# Patient Record
Sex: MALE | Race: WHITE | Employment: FULL TIME | URBAN - METROPOLITAN AREA
[De-identification: names, ages, dates, MRNs, and addresses within clinical notes are randomized per-mention and may not be internally consistent; named-entity substitution may affect disease eponyms.]

---

## 2021-07-15 ENCOUNTER — OFFICE VISIT (OUTPATIENT)
Dept: FAMILY MEDICINE | Facility: CLINIC | Age: 41
End: 2021-07-15
Payer: COMMERCIAL

## 2021-07-15 ENCOUNTER — ANCILLARY PROCEDURE (OUTPATIENT)
Dept: GENERAL RADIOLOGY | Facility: CLINIC | Age: 41
End: 2021-07-15
Attending: PHYSICIAN ASSISTANT
Payer: COMMERCIAL

## 2021-07-15 VITALS
HEART RATE: 64 BPM | SYSTOLIC BLOOD PRESSURE: 121 MMHG | WEIGHT: 172 LBS | DIASTOLIC BLOOD PRESSURE: 85 MMHG | OXYGEN SATURATION: 100 %

## 2021-07-15 DIAGNOSIS — M25.562 PAIN AND SWELLING OF LEFT KNEE: Primary | ICD-10-CM

## 2021-07-15 DIAGNOSIS — M25.462 PAIN AND SWELLING OF LEFT KNEE: Primary | ICD-10-CM

## 2021-07-15 DIAGNOSIS — Z13.220 LIPID SCREENING: ICD-10-CM

## 2021-07-15 DIAGNOSIS — M25.562 PAIN AND SWELLING OF LEFT KNEE: ICD-10-CM

## 2021-07-15 DIAGNOSIS — Z13.1 SCREENING FOR DIABETES MELLITUS: ICD-10-CM

## 2021-07-15 DIAGNOSIS — M25.462 PAIN AND SWELLING OF LEFT KNEE: ICD-10-CM

## 2021-07-15 LAB
ALBUMIN SERPL-MCNC: 4 G/DL (ref 3.4–5)
ALP SERPL-CCNC: 45 U/L (ref 40–150)
ALT SERPL W P-5'-P-CCNC: 23 U/L (ref 0–70)
ANION GAP SERPL CALCULATED.3IONS-SCNC: 2 MMOL/L (ref 3–14)
AST SERPL W P-5'-P-CCNC: 11 U/L (ref 0–45)
BASOPHILS # BLD AUTO: 0 10E3/UL (ref 0–0.2)
BASOPHILS NFR BLD AUTO: 0 %
BILIRUB SERPL-MCNC: 0.4 MG/DL (ref 0.2–1.3)
BUN SERPL-MCNC: 11 MG/DL (ref 7–30)
CALCIUM SERPL-MCNC: 9.3 MG/DL (ref 8.5–10.1)
CHLORIDE BLD-SCNC: 104 MMOL/L (ref 94–109)
CHOLEST SERPL-MCNC: 189 MG/DL
CO2 SERPL-SCNC: 31 MMOL/L (ref 20–32)
CREAT SERPL-MCNC: 0.98 MG/DL (ref 0.66–1.25)
EOSINOPHIL # BLD AUTO: 0.1 10E3/UL (ref 0–0.7)
EOSINOPHIL NFR BLD AUTO: 1 %
ERYTHROCYTE [DISTWIDTH] IN BLOOD BY AUTOMATED COUNT: 13.4 % (ref 10–15)
FASTING STATUS PATIENT QL REPORTED: YES
GFR SERPL CREATININE-BSD FRML MDRD: >90 ML/MIN/1.73M2
GLUCOSE BLD-MCNC: 108 MG/DL (ref 70–99)
HCT VFR BLD AUTO: 47.4 % (ref 40–53)
HDLC SERPL-MCNC: 74 MG/DL
HGB BLD-MCNC: 16.1 G/DL (ref 13.3–17.7)
LDLC SERPL CALC-MCNC: 107 MG/DL
LYMPHOCYTES # BLD AUTO: 1.7 10E3/UL (ref 0.8–5.3)
LYMPHOCYTES NFR BLD AUTO: 21 %
MCH RBC QN AUTO: 31.6 PG (ref 26.5–33)
MCHC RBC AUTO-ENTMCNC: 34 G/DL (ref 31.5–36.5)
MCV RBC AUTO: 93 FL (ref 78–100)
MONOCYTES # BLD AUTO: 0.7 10E3/UL (ref 0–1.3)
MONOCYTES NFR BLD AUTO: 9 %
NEUTROPHILS # BLD AUTO: 5.4 10E3/UL (ref 1.6–8.3)
NEUTROPHILS NFR BLD AUTO: 69 %
NONHDLC SERPL-MCNC: 115 MG/DL
PLATELET # BLD AUTO: 282 10E3/UL (ref 150–450)
POTASSIUM BLD-SCNC: 4.2 MMOL/L (ref 3.4–5.3)
PROT SERPL-MCNC: 7.4 G/DL (ref 6.8–8.8)
RBC # BLD AUTO: 5.1 10E6/UL (ref 4.4–5.9)
SODIUM SERPL-SCNC: 137 MMOL/L (ref 133–144)
TRIGL SERPL-MCNC: 42 MG/DL
URATE SERPL-MCNC: 4.6 MG/DL
WBC # BLD AUTO: 7.9 10E3/UL (ref 4–11)

## 2021-07-15 PROCEDURE — 99204 OFFICE O/P NEW MOD 45 MIN: CPT | Performed by: PHYSICIAN ASSISTANT

## 2021-07-15 PROCEDURE — 80053 COMPREHEN METABOLIC PANEL: CPT | Performed by: PHYSICIAN ASSISTANT

## 2021-07-15 PROCEDURE — 85025 COMPLETE CBC W/AUTO DIFF WBC: CPT | Performed by: PHYSICIAN ASSISTANT

## 2021-07-15 PROCEDURE — 73562 X-RAY EXAM OF KNEE 3: CPT | Mod: LT | Performed by: RADIOLOGY

## 2021-07-15 PROCEDURE — 80061 LIPID PANEL: CPT | Performed by: PHYSICIAN ASSISTANT

## 2021-07-15 PROCEDURE — 84550 ASSAY OF BLOOD/URIC ACID: CPT | Performed by: PHYSICIAN ASSISTANT

## 2021-07-15 PROCEDURE — 36415 COLL VENOUS BLD VENIPUNCTURE: CPT | Performed by: PHYSICIAN ASSISTANT

## 2021-07-15 RX ORDER — INDOMETHACIN 50 MG/1
50 CAPSULE ORAL 2 TIMES DAILY WITH MEALS
Qty: 60 CAPSULE | Refills: 1 | Status: SHIPPED | OUTPATIENT
Start: 2021-07-15

## 2021-07-15 NOTE — RESULT ENCOUNTER NOTE
Jessica Conner,       Your recent test results are attached, if you have any questions or concerns please feel free to contact me via e-mail or call [phone].  *Anion gap is normal. Our lab readings have been off for a long time for that test so please ignore this.       Blood sugar is slightly elevated at 108. Eating healthy (less sugar and carbohydrates and more vegetables/lean meat) should help this. Would recheck 1 year. Liver and kidney tests normal. Cholesterol looks great.  Uric acid level is low making gout less likely.        It was a pleasure to see you at your recent office visit.      Sincerely,  Jamaica Carrillo PA-C

## 2021-07-15 NOTE — PROGRESS NOTES
Assessment & Plan     Pain and swelling of left knee  Suspect suprapatella bursitis from kneeling, but with joint effusion could also be meniscal or other internal injury as well  Gout also in consideration, labs pended  Infection/cellulitis/septic joint less likely without systemic symptoms or elevated wbc, see more for that below  Xray showed mild joint effusion and suprapatellar edema    - CBC with platelets and differential; Future  - Uric acid; Future  - XR Knee Left 3 Views; Future  - Uric acid  - CBC with platelets and differential  - indomethacin (INDOCIN) 50 MG capsule; Take 1 capsule (50 mg) by mouth 2 times daily (with meals) For 5 days then as needed for swelling/pain.  Take with food.  - Orthopedic  Referral; Future    Lipid screening    - Lipid panel reflex to direct LDL Fasting; Future  - Lipid panel reflex to direct LDL Fasting    Screening for diabetes mellitus    - Comprehensive metabolic panel (BMP + Alb, Alk Phos, ALT, AST, Total. Bili, TP); Future  - Comprehensive metabolic panel (BMP + Alb, Alk Phos, ALT, AST, Total. Bili, TP)    Patient Instructions   Labs do not suggest infection, however if you develop fever or worsening warmth/redness to the knee let me know immediately or be seen    I will follow up with xray read and other labs for gout, etc.     Take anti-inflammatory medication as prescribed    Rest knee until no pain with walking    Follow up with ortho if not improved by Monday with rest    Ice as needed        Return in about 3 days (around 7/18/2021) for if not improving.    [facility]     Pain History:  When did you first notice your pain? - Less than 1 week   Have you seen anyone else for your pain? No  Where in your body do you have pain? Musculoskeletal problem/pain  Onset/Duration: Yesterday  Description  Location: knee - left  Joint Swelling: YES  Redness: no  Pain: YES  Warmth: YES  Intensity:   mild  Progression of Symptoms:  worsening  Accompanying signs and symptoms:   Fevers: no  Numbness/tingling/weakness: no  History  Trauma to the area: no  Recent illness:  no  Previous similar problem: no  Previous evaluation:  no  Precipitating or alleviating factors:  Aggravating factors include: sitting, standing, walking, climbing stairs and exercise  Therapies tried and outcome: nothing      No injury. Kneels a lot as a . Came home from work and it acutely was swollen.   Painful, hard to walk. Wife is with today. No fever. No h/o gout. No other joint pains.   No previous knee problems on that side.           Review of Systems   Constitutional, HEENT, cardiovascular, pulmonary, GI, , musculoskeletal, neuro, skin, endocrine and psych systems are negative, except as otherwise noted.      Objective    /85   Pulse 64   Wt 78 kg (172 lb)   SpO2 100%   There is no height or weight on file to calculate BMI.  Physical Exam   GENERAL: healthy, alert and no distress  RESP: lungs clear to auscultation - no rales, rhonchi or wheezes  CV: regular rate and rhythm, normal S1 S2, no S3 or S4, no murmur, click or rub, no peripheral edema and peripheral pulses strong  MS: no gross musculoskeletal defects noted, no edema  NEURO: Normal strength and tone, mentation intact and speech normal  PSYCH: mentation appears normal, affect normal/bright    Ortho: left Knee- no erythema but is slightly warmer to touch than right knee over patella region.   small ? effusion. Most edema is overlying patella. Mostly tender here but also both joint lines.  No ecchymosis.Range of motion decreased with pain.  Sensation intact distally.  Distal pulses strong. Hip, knee, and ankle strength 5/5 and equal bilaterally.  Anterior drawer sign negative. Valgus(MCL)/varus (LCL) testing negative for pain.  McMurrays negative.           Results for orders placed or performed in visit on 07/15/21   CBC with platelets and differential      Status: None   Result Value Ref Range    WBC Count 7.9 4.0 - 11.0 10e3/uL    RBC Count 5.10 4.40 - 5.90 10e6/uL    Hemoglobin 16.1 13.3 - 17.7 g/dL    Hematocrit 47.4 40.0 - 53.0 %    MCV 93 78 - 100 fL    MCH 31.6 26.5 - 33.0 pg    MCHC 34.0 31.5 - 36.5 g/dL    RDW 13.4 10.0 - 15.0 %    Platelet Count 282 150 - 450 10e3/uL    % Neutrophils 69 %    % Lymphocytes 21 %    % Monocytes 9 %    % Eosinophils 1 %    % Basophils 0 %    Absolute Neutrophils 5.4 1.6 - 8.3 10e3/uL    Absolute Lymphocytes 1.7 0.8 - 5.3 10e3/uL    Absolute Monocytes 0.7 0.0 - 1.3 10e3/uL    Absolute Eosinophils 0.1 0.0 - 0.7 10e3/uL    Absolute Basophils 0.0 0.0 - 0.2 10e3/uL   CBC with platelets and differential     Status: None    Narrative    The following orders were created for panel order CBC with platelets and differential.  Procedure                               Abnormality         Status                     ---------                               -----------         ------                     CBC with platelets and d...[[id number]]                      Final result                 Please view results for these tests on the individual orders.

## 2021-07-15 NOTE — PATIENT INSTRUCTIONS
Labs do not suggest infection, however if you develop fever or worsening warmth/redness to the knee let me know immediately or be seen    I will follow up with xray read and other labs for gout, etc.     Take anti-inflammatory medication as prescribed    Rest knee until no pain with walking    Follow up with ortho if not improved by Monday with rest    Ice as needed

## 2021-07-22 ENCOUNTER — OFFICE VISIT (OUTPATIENT)
Dept: ORTHOPEDICS | Facility: CLINIC | Age: 41
End: 2021-07-22
Attending: PHYSICIAN ASSISTANT
Payer: COMMERCIAL

## 2021-07-22 VITALS
HEIGHT: 68 IN | BODY MASS INDEX: 26.07 KG/M2 | SYSTOLIC BLOOD PRESSURE: 120 MMHG | WEIGHT: 172 LBS | DIASTOLIC BLOOD PRESSURE: 82 MMHG

## 2021-07-22 DIAGNOSIS — M25.462 PAIN AND SWELLING OF LEFT KNEE: ICD-10-CM

## 2021-07-22 DIAGNOSIS — M25.562 PAIN AND SWELLING OF LEFT KNEE: ICD-10-CM

## 2021-07-22 DIAGNOSIS — M70.42 PREPATELLAR BURSITIS OF LEFT KNEE: Primary | ICD-10-CM

## 2021-07-22 PROCEDURE — 99243 OFF/OP CNSLTJ NEW/EST LOW 30: CPT | Performed by: FAMILY MEDICINE

## 2021-07-22 ASSESSMENT — MIFFLIN-ST. JEOR: SCORE: 1669.69

## 2021-07-22 NOTE — PROGRESS NOTES
"Ubaldo Green  :  1980  DOS: 2021  MRN: [id number]    Sports Medicine Clinic Visit    PCP: Hake[facility], [facility]    Ubaldo Green is a 41 year old male who is seen in consultation at the request of  Jamaica Carrillo PA-C presenting with left knee pain and swelling.    Injury: Kneeling for prolonged period of pain at work ~ 9 days ago (21) when noted gradual swelling the following day.  Pain located over left anterior knee, radiating to lower leg.  Additional Features:  Positive: swelling and grinding.  Symptoms are better with Other medications: indomethacin and Rest.  Symptoms are worse with: direct pressure, bending knee, kneeling.  Other evaluation and/or treatments so far consists of: Ibuprofen, Other medications: indomethacin, Rest and PCP.  Recent imaging completed: X-rays completed 7/15/21.  Prior History of related problems: none    Social History: works as a     Review of Systems  Musculoskeletal: as above  Remainder of review of systems is negative including constitutional, CV, pulmonary, GI, Skin and Neurologic except as noted in HPI or medical history.    No past medical history on file.  No past surgical history on file.  No family history on file.    Objective  /82   Ht 1.727 m (5' 8\")   Wt 78 kg (172 lb)   BMI 26.15 kg/m        General: healthy, alert and in no distress      HEENT: no scleral icterus or conjunctival erythema     Skin: no suspicious lesions or rash. No jaundice.     CV: regular rhythm by palpation, 2+ distal pulses, no pedal edema      Resp: normal respiratory effort without conversational dyspnea     Psych: normal mood and affect      Gait: nonantalgic, appropriate coordination and balance     Neuro: normal light touch sensory exam of the extremities. Motor strength as noted below     Left Knee exam    ROM:        Full active and passive ROM with flexion and extension    Inspection:       no visible ecchymosis       no visible edema or " effusion       fluid in prepatelar bursa    Skin:       no visible deformities       well perfused, no warmth, redness or induration       capillary refill brisk    Patellar Motion:        Normal patellar tracking noted through range of motion       Crepitus noted in the patellofemoral joint    Tender:        Over prepatellar bursa    Non Tender:         remainder of knee area        medial patellar border        lateral patellar border        medial joint line        lateral joint line        along MCL        distal IT Band        infrapatellar tendon        tibial tubercle    Special Tests:        neg (-) Braxton       neg (-) Lachman       neg (-) varus at 0 deg and 30 deg       neg (-) valgus at 0 deg and 30 deg    Evaluation of ipsilateral kinetic chain       normal strength with hip extension and abduction      Radiology  Results for orders placed or performed in visit on 07/15/21   XR Knee Left 3 Views    Narrative    XR KNEE LEFT 3 VIEWS 7/15/2021 7:38 AM     HISTORY: pain, acutely swollen; Pain and swelling of left knee; Pain  and swelling of left knee      Impression    IMPRESSION: Nonspecific mild joint effusion. Suspected prepatellar  soft tissue swelling. No apparent fracture or joint space widths  appear within normal limits.    MOISES ESCALANTE MD         SYSTEM ID:  [id number]       Assessment:  1. Prepatellar bursitis of left knee    2. Pain and swelling of left knee        Plan:  Discussed the assessment with the patient.  Follow up: prn based on clinical progress  XR images independently visualized and reviewed with patient today in clinic  Swelling and pain are progressively improving over the anterior knee  Signs of resolving prepatellar bursitis, no concerning signs for infection  Reviewed signs and symptoms of infection, should seek prompt care if this happens  Reviewed use of kneepads when kneeling as needed, he is a  and is hard to completely avoid kneeling  Signs of small effusion on  the x-ray, this is resolved on exam today, likely irritation of the patellofemoral joint with prolonged kneeling  Reviewed that the swelling can take many months to resolve completely but that the pain resolves generally much faster  Could consider aspiration and injection for labile symptoms or aspiration if there is concern for infection, but will defer this for now and hope to avoid  We discussed modified progressive pain-free activity as tolerated  Home handouts provided and supportive care reviewed  All questions were answered today  Contact us with additional questions or concerns  Signs and sx of concern reviewed    Thanks very much for sending this nice gentleman to us, I will keep you updated with his progress      Last Guevara DO, [place]  Sports Medicine Physician  [facility]                  Disclaimer: This note consists of symbols derived from keyboarding, dictation and/or voice recognition software. As a result, there may be errors in the script that have gone undetected. Please consider this when interpreting information found in this chart.

## 2021-07-22 NOTE — LETTER
"    2021         RE: Ubaldo Green  [address] [address]        Dear Colleague,    Thank you for referring your patient, Ubaldo Green, to the [facility]. Please see a copy of my visit note below.    Ubaldo Green  :  1980  DOS: 2021  MRN: [id number]    Sports Medicine Clinic Visit    PCP: [facility]    Ubaldo Green is a 41 year old male who is seen in consultation at the request of  Jamaica Carrillo PA-C presenting with left knee pain and swelling.    Injury: Kneeling for prolonged period of pain at work ~ 9 days ago (21) when noted gradual swelling the following day.  Pain located over left anterior knee, radiating to lower leg.  Additional Features:  Positive: swelling and grinding.  Symptoms are better with Other medications: indomethacin and Rest.  Symptoms are worse with: direct pressure, bending knee, kneeling.  Other evaluation and/or treatments so far consists of: Ibuprofen, Other medications: indomethacin, Rest and PCP.  Recent imaging completed: X-rays completed 7/15/21.  Prior History of related problems: none    Social History: works as a     Review of Systems  Musculoskeletal: as above  Remainder of review of systems is negative including constitutional, CV, pulmonary, GI, Skin and Neurologic except as noted in HPI or medical history.    No past medical history on file.  No past surgical history on file.  No family history on file.    Objective  /82   Ht 1.727 m (5' 8\")   Wt 78 kg (172 lb)   BMI 26.15 kg/m        General: healthy, alert and in no distress      HEENT: no scleral icterus or conjunctival erythema     Skin: no suspicious lesions or rash. No jaundice.     CV: regular rhythm by palpation, 2+ distal pulses, no pedal edema      Resp: normal respiratory effort without conversational dyspnea     Psych: normal mood and affect      Gait: nonantalgic, appropriate coordination and " balance     Neuro: normal light touch sensory exam of the extremities. Motor strength as noted below     Left Knee exam    ROM:        Full active and passive ROM with flexion and extension    Inspection:       no visible ecchymosis       no visible edema or effusion       fluid in prepatelar bursa    Skin:       no visible deformities       well perfused, no warmth, redness or induration       capillary refill brisk    Patellar Motion:        Normal patellar tracking noted through range of motion       Crepitus noted in the patellofemoral joint    Tender:        Over prepatellar bursa    Non Tender:         remainder of knee area        medial patellar border        lateral patellar border        medial joint line        lateral joint line        along MCL        distal IT Band        infrapatellar tendon        tibial tubercle    Special Tests:        neg (-) Braxton       neg (-) Lachman       neg (-) varus at 0 deg and 30 deg       neg (-) valgus at 0 deg and 30 deg    Evaluation of ipsilateral kinetic chain       normal strength with hip extension and abduction      Radiology  Results for orders placed or performed in visit on 07/15/21   XR Knee Left 3 Views    Narrative    XR KNEE LEFT 3 VIEWS 7/15/2021 7:38 AM     HISTORY: pain, acutely swollen; Pain and swelling of left knee; Pain  and swelling of left knee      Impression    IMPRESSION: Nonspecific mild joint effusion. Suspected prepatellar  soft tissue swelling. No apparent fracture or joint space widths  appear within normal limits.    MOISES ESCALANTE MD         SYSTEM ID:  [id number]       Assessment:  1. Prepatellar bursitis of left knee    2. Pain and swelling of left knee        Plan:  Discussed the assessment with the patient.  Follow up: prn based on clinical progress  XR images independently visualized and reviewed with patient today in clinic  Swelling and pain are progressively improving over the anterior knee  Signs of resolving prepatellar  bursitis, no concerning signs for infection  Reviewed signs and symptoms of infection, should seek prompt care if this happens  Reviewed use of kneepads when kneeling as needed, he is a  and is hard to completely avoid kneeling  Signs of small effusion on the x-ray, this is resolved on exam today, likely irritation of the patellofemoral joint with prolonged kneeling  Reviewed that the swelling can take many months to resolve completely but that the pain resolves generally much faster  Could consider aspiration and injection for labile symptoms or aspiration if there is concern for infection, but will defer this for now and hope to avoid  We discussed modified progressive pain-free activity as tolerated  Home handouts provided and supportive care reviewed  All questions were answered today  Contact us with additional questions or concerns  Signs and sx of concern reviewed    Thanks very much for sending this nice gentleman to us, I will keep you updated with his progress      Last Guevara DO, ONI  Sports Medicine Physician  [facility] and Sports Medicine                  Disclaimer: This note consists of symbols derived from keyboarding, dictation and/or voice recognition software. As a result, there may be errors in the script that have gone undetected. Please consider this when interpreting information found in this chart.      Again, thank you for allowing me to participate in the care of your patient.        Sincerely,        Last Guevara DO

## 2021-08-14 ENCOUNTER — HEALTH MAINTENANCE LETTER (OUTPATIENT)
Age: 41
End: 2021-08-14

## 2021-10-10 ENCOUNTER — HEALTH MAINTENANCE LETTER (OUTPATIENT)
Age: 41
End: 2021-10-10

## 2022-09-18 ENCOUNTER — HEALTH MAINTENANCE LETTER (OUTPATIENT)
Age: 42
End: 2022-09-18

## 2023-10-08 ENCOUNTER — HEALTH MAINTENANCE LETTER (OUTPATIENT)
Age: 43
End: 2023-10-08